# Patient Record
Sex: MALE | Race: WHITE
[De-identification: names, ages, dates, MRNs, and addresses within clinical notes are randomized per-mention and may not be internally consistent; named-entity substitution may affect disease eponyms.]

---

## 2021-01-21 ENCOUNTER — HOSPITAL ENCOUNTER (OUTPATIENT)
Dept: HOSPITAL 50 - VM.SDS | Age: 66
Discharge: HOME | End: 2021-01-21
Attending: FAMILY MEDICINE
Payer: COMMERCIAL

## 2021-01-21 DIAGNOSIS — E78.5: ICD-10-CM

## 2021-01-21 DIAGNOSIS — E78.00: ICD-10-CM

## 2021-01-21 DIAGNOSIS — Z79.82: ICD-10-CM

## 2021-01-21 DIAGNOSIS — G47.33: ICD-10-CM

## 2021-01-21 DIAGNOSIS — Z12.11: Primary | ICD-10-CM

## 2021-01-21 DIAGNOSIS — K63.3: ICD-10-CM

## 2021-01-21 DIAGNOSIS — Z01.812: ICD-10-CM

## 2021-01-21 DIAGNOSIS — Z20.822: ICD-10-CM

## 2021-01-21 DIAGNOSIS — G62.9: ICD-10-CM

## 2021-01-21 DIAGNOSIS — Z79.899: ICD-10-CM

## 2021-01-21 DIAGNOSIS — Z98.890: ICD-10-CM

## 2021-01-21 PROCEDURE — U0002 COVID-19 LAB TEST NON-CDC: HCPCS

## 2021-01-21 NOTE — OR
DATE OF SURGERY:  01/21/2021.

 

REFERRING PROVIDER:  Pia Estevez MD

 

PRE-OPERATIVE DIAGNOSIS:

Screening colonoscopy.  Last colonoscopy was 04/2010.  The patient denies any

family history of colon cancer or significant colon polyps.

 

POST-OPERATIVE DIAGNOSES:

1. Mild inflammation of the ileocecal valve prominence.  Cold biopsy x2 bites

    taken.

2. Normal-appearing terminal ileum.

3. Otherwise normal colon without any evidence for inflammation or polyps.

 

PROCEDURE:  Colonoscopy with biopsy x1 site using cold forceps.

 

SURGEON:  Mamadou Dawson M.D.

ANESTHESIA:  Monitored anesthesia care.

BOWEL PREP:  Good.

 

Alonso is a 65-year-old male who was brought to the endoscopy suite after

discussing risks and benefits of the procedure.  Informed consent was obtained

for conscious sedation and colonoscopy with or without biopsy and/or

polypectomy.  We also discussed possibility of missed lesions.  Pre-procedure

exam was unremarkable.  IV, oxygen, and monitors were placed.  The patient was

placed in the left lateral decubitus position.  Sedation was administered and a

digital rectal exam was performed and unremarkable.  Colonoscope was passed into

the rectum and slowly advanced all the way to the cecum.  Cecum was viewed and

photographed.  Ileocecal valve was intubated and terminal ileum was normal in

appearance.  Within the cecum, the ileocecal valve prominence did appear mildly

inflamed with questionable small superficial ulcerations.  Cold biopsy x2 bites

taken.  The colonoscope was slowly withdrawn and the mucosa was closely observed

in a direct circumferential manner.  The ascending colon was unremarkable.  The

transverse colon was unremarkable.  The descending colon was unremarkable.  The

sigmoid colon was unremarkable.  Retroflexion was performed and rectal mucosa

was unremarkable.  Scope was removed.  The patient tolerated the procedure well.

The patient was monitored until that baseline status.  Discharge instructions

were reviewed and the patient was discharged in good condition.

 

COMPLICATIONS:  None.

TOTAL TIME:  22 minutes.

ESTIMATED BLOOD LOSS:  Less than 1 mL.

 

RECOMMENDATIONS/FOLLOW-UP:  We will await results of path report to determine

ideal followup interval.  The patient can go ahead and resume his Coumadin

tonight and his aspirin tomorrow.  I would like to kindly thank Dr. Estevez for

this referral.

 

 

DMB:  01/21/2021 09:10:01  MODL:  01/21/2021 10:06:42

Job #:  [id number]